# Patient Record
Sex: MALE | Race: WHITE | NOT HISPANIC OR LATINO | URBAN - METROPOLITAN AREA
[De-identification: names, ages, dates, MRNs, and addresses within clinical notes are randomized per-mention and may not be internally consistent; named-entity substitution may affect disease eponyms.]

---

## 2019-09-01 ENCOUNTER — EMERGENCY (EMERGENCY)
Facility: HOSPITAL | Age: 84
LOS: 1 days | Discharge: HOME | End: 2019-09-01
Attending: EMERGENCY MEDICINE
Payer: MEDICARE

## 2019-09-01 VITALS
TEMPERATURE: 98 F | OXYGEN SATURATION: 100 % | SYSTOLIC BLOOD PRESSURE: 186 MMHG | RESPIRATION RATE: 20 BRPM | DIASTOLIC BLOOD PRESSURE: 86 MMHG | HEART RATE: 58 BPM | WEIGHT: 209 LBS | HEIGHT: 69 IN

## 2019-09-01 VITALS — DIASTOLIC BLOOD PRESSURE: 62 MMHG | SYSTOLIC BLOOD PRESSURE: 120 MMHG

## 2019-09-01 LAB
ALBUMIN SERPL ELPH-MCNC: 4.1 G/DL — SIGNIFICANT CHANGE UP (ref 3.3–5.2)
ALP SERPL-CCNC: 77 U/L — SIGNIFICANT CHANGE UP (ref 40–120)
ALT FLD-CCNC: 12 U/L — SIGNIFICANT CHANGE UP
ANION GAP SERPL CALC-SCNC: 13 MMOL/L — SIGNIFICANT CHANGE UP (ref 5–17)
APTT BLD: 44.2 SEC — HIGH (ref 27.5–36.3)
AST SERPL-CCNC: 18 U/L — SIGNIFICANT CHANGE UP
BILIRUB SERPL-MCNC: 1.2 MG/DL — SIGNIFICANT CHANGE UP (ref 0.4–2)
BUN SERPL-MCNC: 20 MG/DL — SIGNIFICANT CHANGE UP (ref 8–20)
CALCIUM SERPL-MCNC: 9 MG/DL — SIGNIFICANT CHANGE UP (ref 8.6–10.2)
CHLORIDE SERPL-SCNC: 105 MMOL/L — SIGNIFICANT CHANGE UP (ref 98–107)
CO2 SERPL-SCNC: 24 MMOL/L — SIGNIFICANT CHANGE UP (ref 22–29)
CREAT SERPL-MCNC: 1.04 MG/DL — SIGNIFICANT CHANGE UP (ref 0.5–1.3)
D DIMER BLD IA.RAPID-MCNC: <150 NG/ML DDU — SIGNIFICANT CHANGE UP
GLUCOSE SERPL-MCNC: 135 MG/DL — HIGH (ref 70–115)
HCT VFR BLD CALC: 42.4 % — SIGNIFICANT CHANGE UP (ref 39–50)
HGB BLD-MCNC: 14 G/DL — SIGNIFICANT CHANGE UP (ref 13–17)
INR BLD: 1.19 RATIO — HIGH (ref 0.88–1.16)
MCHC RBC-ENTMCNC: 30.4 PG — SIGNIFICANT CHANGE UP (ref 27–34)
MCHC RBC-ENTMCNC: 33 GM/DL — SIGNIFICANT CHANGE UP (ref 32–36)
MCV RBC AUTO: 92.2 FL — SIGNIFICANT CHANGE UP (ref 80–100)
NT-PROBNP SERPL-SCNC: 122 PG/ML — SIGNIFICANT CHANGE UP (ref 0–300)
PLATELET # BLD AUTO: 134 K/UL — LOW (ref 150–400)
POTASSIUM SERPL-MCNC: 4.1 MMOL/L — SIGNIFICANT CHANGE UP (ref 3.5–5.3)
POTASSIUM SERPL-SCNC: 4.1 MMOL/L — SIGNIFICANT CHANGE UP (ref 3.5–5.3)
PROT SERPL-MCNC: 6.6 G/DL — SIGNIFICANT CHANGE UP (ref 6.6–8.7)
PROTHROM AB SERPL-ACNC: 13.7 SEC — HIGH (ref 10–12.9)
RBC # BLD: 4.6 M/UL — SIGNIFICANT CHANGE UP (ref 4.2–5.8)
RBC # FLD: 12.4 % — SIGNIFICANT CHANGE UP (ref 10.3–14.5)
SODIUM SERPL-SCNC: 142 MMOL/L — SIGNIFICANT CHANGE UP (ref 135–145)
TROPONIN T SERPL-MCNC: <0.01 NG/ML — SIGNIFICANT CHANGE UP (ref 0–0.06)
TROPONIN T SERPL-MCNC: <0.01 NG/ML — SIGNIFICANT CHANGE UP (ref 0–0.06)
WBC # BLD: 5.47 K/UL — SIGNIFICANT CHANGE UP (ref 3.8–10.5)
WBC # FLD AUTO: 5.47 K/UL — SIGNIFICANT CHANGE UP (ref 3.8–10.5)

## 2019-09-01 PROCEDURE — 85027 COMPLETE CBC AUTOMATED: CPT

## 2019-09-01 PROCEDURE — 80053 COMPREHEN METABOLIC PANEL: CPT

## 2019-09-01 PROCEDURE — 85610 PROTHROMBIN TIME: CPT

## 2019-09-01 PROCEDURE — 74174 CTA ABD&PLVS W/CONTRAST: CPT

## 2019-09-01 PROCEDURE — 71275 CT ANGIOGRAPHY CHEST: CPT

## 2019-09-01 PROCEDURE — 85379 FIBRIN DEGRADATION QUANT: CPT

## 2019-09-01 PROCEDURE — 96374 THER/PROPH/DIAG INJ IV PUSH: CPT | Mod: XU

## 2019-09-01 PROCEDURE — 85730 THROMBOPLASTIN TIME PARTIAL: CPT

## 2019-09-01 PROCEDURE — 74174 CTA ABD&PLVS W/CONTRAST: CPT | Mod: 26

## 2019-09-01 PROCEDURE — 99285 EMERGENCY DEPT VISIT HI MDM: CPT | Mod: 25

## 2019-09-01 PROCEDURE — 93005 ELECTROCARDIOGRAM TRACING: CPT

## 2019-09-01 PROCEDURE — 96375 TX/PRO/DX INJ NEW DRUG ADDON: CPT | Mod: XU

## 2019-09-01 PROCEDURE — 36415 COLL VENOUS BLD VENIPUNCTURE: CPT

## 2019-09-01 PROCEDURE — 93010 ELECTROCARDIOGRAM REPORT: CPT

## 2019-09-01 PROCEDURE — 71275 CT ANGIOGRAPHY CHEST: CPT | Mod: 26

## 2019-09-01 PROCEDURE — 71045 X-RAY EXAM CHEST 1 VIEW: CPT

## 2019-09-01 PROCEDURE — 83880 ASSAY OF NATRIURETIC PEPTIDE: CPT

## 2019-09-01 PROCEDURE — 84484 ASSAY OF TROPONIN QUANT: CPT

## 2019-09-01 PROCEDURE — 99284 EMERGENCY DEPT VISIT MOD MDM: CPT

## 2019-09-01 PROCEDURE — 71045 X-RAY EXAM CHEST 1 VIEW: CPT | Mod: 26

## 2019-09-01 RX ORDER — LANOLIN ALCOHOL/MO/W.PET/CERES
1 CREAM (GRAM) TOPICAL
Qty: 0 | Refills: 0 | DISCHARGE

## 2019-09-01 RX ORDER — ATORVASTATIN CALCIUM 80 MG/1
1 TABLET, FILM COATED ORAL
Qty: 0 | Refills: 0 | DISCHARGE

## 2019-09-01 RX ORDER — ASPIRIN/CALCIUM CARB/MAGNESIUM 324 MG
1 TABLET ORAL
Qty: 0 | Refills: 0 | DISCHARGE

## 2019-09-01 RX ORDER — DABIGATRAN ETEXILATE MESYLATE 150 MG/1
1 CAPSULE ORAL
Qty: 0 | Refills: 0 | DISCHARGE

## 2019-09-01 RX ORDER — MORPHINE SULFATE 50 MG/1
4 CAPSULE, EXTENDED RELEASE ORAL ONCE
Refills: 0 | Status: DISCONTINUED | OUTPATIENT
Start: 2019-09-01 | End: 2019-09-01

## 2019-09-01 RX ORDER — ZOLPIDEM TARTRATE 10 MG/1
1 TABLET ORAL
Qty: 0 | Refills: 0 | DISCHARGE

## 2019-09-01 RX ORDER — METOPROLOL TARTRATE 50 MG
0 TABLET ORAL
Qty: 0 | Refills: 0 | DISCHARGE

## 2019-09-01 RX ORDER — HYDRALAZINE HCL 50 MG
5 TABLET ORAL ONCE
Refills: 0 | Status: COMPLETED | OUTPATIENT
Start: 2019-09-01 | End: 2019-09-01

## 2019-09-01 RX ORDER — ONDANSETRON 8 MG/1
4 TABLET, FILM COATED ORAL ONCE
Refills: 0 | Status: COMPLETED | OUTPATIENT
Start: 2019-09-01 | End: 2019-09-01

## 2019-09-01 RX ADMIN — MORPHINE SULFATE 4 MILLIGRAM(S): 50 CAPSULE, EXTENDED RELEASE ORAL at 09:28

## 2019-09-01 RX ADMIN — Medication 5 MILLIGRAM(S): at 09:27

## 2019-09-01 RX ADMIN — ONDANSETRON 4 MILLIGRAM(S): 8 TABLET, FILM COATED ORAL at 09:26

## 2019-09-01 NOTE — ED ADULT NURSE NOTE - NSIMPLEMENTINTERV_GEN_ALL_ED
Implemented All Universal Safety Interventions:  [place] to call system. Call bell, personal items and telephone within reach. Instruct patient to call for assistance. Room bathroom lighting operational. Non-slip footwear when patient is off stretcher. Physically safe environment: no spills, clutter or unnecessary equipment. Stretcher in lowest position, wheels locked, appropriate side rails in place.

## 2019-09-01 NOTE — ED ADULT NURSE REASSESSMENT NOTE - NS ED NURSE REASSESS COMMENT FT1
Pt's BP & back pain improved after medication administration. Pt waiting for CT, pt & family aware of plan of care. Pt's BP & back pain improved after medication administration. Pt states he went from a 10/10 pain to a 0/10 pain. Pt waiting for CT, pt & family aware of plan of care.

## 2019-09-01 NOTE — ED ADULT TRIAGE NOTE - CHIEF COMPLAINT QUOTE
c/o upper back pain, when he moves pain is worse and worse with talking, diff. breathing started this morning

## 2019-09-01 NOTE — ED ADULT NURSE NOTE - OBJECTIVE STATEMENT
Pt arrives to ED c/o sudden onset of back pain in the right scapula region. Pt states he was sitting at the table eating when pain occurred, denies lifting something heavy, denies injury.  Pt A & OX4, K[place], hearing aid in right ear.

## 2019-09-05 NOTE — ED PROVIDER NOTE - PATIENT PORTAL LINK FT
You can access the FollowMyHealth Patient Portal offered by [facility] by registering at the following website: [email or web address]. By joining [employer]’s FollowMyHealth portal, you will also be able to view your health information using other applications (apps) compatible with our system.

## 2019-09-05 NOTE — ED PROVIDER NOTE - OBJECTIVE STATEMENT
89 yo male pmh afib, htn , hdl comes to ed with acute onset of mid scapular back pain which is increased with deep inspiration; pt denies any fever, cough , chest pain or abdominal pain;

## 2019-09-05 NOTE — ED PROVIDER NOTE - CLINICAL SUMMARY MEDICAL DECISION MAKING FREE TEXT BOX
89yo male pmh afib on pradaxa with acute onset of back pain; eval acs, pe vs aortic origin vs musculosketal;   labs, ct m pain meds and reeval

## 2019-11-13 NOTE — ED ADULT NURSE NOTE - NSSUHOSCREENINGYN_ED_ALL_ED
Attempted to schedule procedure with patient, no answer.  Left message to call office, number provided.   Yes - the patient is able to be screened

## 2019-12-07 ENCOUNTER — EMERGENCY (EMERGENCY)
Facility: HOSPITAL | Age: 84
LOS: 1 days | Discharge: HOME | End: 2019-12-07
Attending: EMERGENCY MEDICINE
Payer: MEDICARE

## 2019-12-07 VITALS
WEIGHT: 209 LBS | HEART RATE: 69 BPM | SYSTOLIC BLOOD PRESSURE: 148 MMHG | OXYGEN SATURATION: 96 % | RESPIRATION RATE: 18 BRPM | HEIGHT: 67 IN | DIASTOLIC BLOOD PRESSURE: 70 MMHG | TEMPERATURE: 100 F

## 2019-12-07 PROCEDURE — 99284 EMERGENCY DEPT VISIT MOD MDM: CPT | Mod: GC

## 2019-12-07 PROCEDURE — 71045 X-RAY EXAM CHEST 1 VIEW: CPT | Mod: 26

## 2019-12-07 RX ORDER — IPRATROPIUM/ALBUTEROL SULFATE 18-103MCG
3 AEROSOL WITH ADAPTER (GRAM) INHALATION ONCE
Refills: 0 | Status: COMPLETED | OUTPATIENT
Start: 2019-12-07 | End: 2019-12-07

## 2019-12-07 NOTE — ED PROVIDER NOTE - CLINICAL SUMMARY MEDICAL DECISION MAKING FREE TEXT BOX
89 y/o M pt with URI sx and chills for 2 days. Clinical suspicion for pna, will order labs, cxr, and ekg. Will give duoneb for wheezes. Plan to likely admit.

## 2019-12-07 NOTE — ED PROVIDER NOTE - ATTENDING CONTRIBUTION TO CARE
Productive cough, RLL crackles and questionable infiltrate on CXR, consistent with community acquired pneumonia. CURB-65 2, discussed possible outpatient treatment but pt lives alone with elderly wife, will admit for inpatient treatment of pneumonia due to mortality risk.

## 2019-12-07 NOTE — ED ADULT NURSE NOTE - NSIMPLEMENTINTERV_GEN_ALL_ED
Implemented All Fall with Harm Risk Interventions:  [place] to call system. Call bell, personal items and telephone within reach. Instruct patient to call for assistance. Room bathroom lighting operational. Non-slip footwear when patient is off stretcher. Physically safe environment: no spills, clutter or unnecessary equipment. Stretcher in lowest position, wheels locked, appropriate side rails in place. Provide visual cue, wrist band, yellow gown, etc. Monitor gait and stability. Monitor for mental status changes and reorient to person, place, and time. Review medications for side effects contributing to fall risk. Reinforce activity limits and safety measures with patient and family. Provide visual clues: red socks.

## 2019-12-07 NOTE — ED PROVIDER NOTE - PROGRESS NOTE DETAILS
CXR suspicious for R base infiltrate, will treat pna with rocephin and azithro. Offered admission vs. d/c for pna tx since pt appears to have stable care at home with family. Pt states he would be more comfortable with admission for tx. Plan to admit. Questions answered. CXR suspicious for R base infiltrate, will treat pna with rocephin and azithro. Offered admission vs. d/c for pna tx since pt appears to have close care at home with family. Pt and son state that they would be more comfortable with admission for tx. Plan to admit. Questions answered. CXR suspicious for R base infiltrate, will treat pna with rocephin and azithro. Offered admission vs. d/c for pna tx since pt appears to have close care at home with family. Pt and son state that they would be more comfortable with admission for tx. [place]: RVP RSV positive. Did show improvement with nebulizer. Plan for observation in CDU, if remaining comfortable on room air and continuing to improve can likely go home within 24 hours.

## 2019-12-07 NOTE — ED ADULT NURSE NOTE - OBJECTIVE STATEMENT
pt received A+Ox4, in no apparent distress. son at bedside. pt c/o congestion, chills and productive cough with yellow/white sputum x3 days. pt denies any CP. RR even and unlabored. pt denies fevers at home. per family, pt is admitted to hospital often in the winter time for tx of PNA/bronchitis. pt able to ambulate with steady gait, GLOVER wellx4. pt educated on POC, verbalized understanding. pt safety measures maintained.

## 2019-12-07 NOTE — ED ADULT TRIAGE NOTE - TEMPERATURE IN CELSIUS (DEGREES C)
Assessment complete. Pt reports post-op abd pain at 3/10 and pt considers it tolerable. Will continue to monitor pain and medicate appropriately. PIV infusing. Safety precautions in place. Call light in reach.   37.7

## 2019-12-07 NOTE — ED PROVIDER NOTE - PHYSICAL EXAMINATION
Gen: no acute distress  Head: normocephalic, atraumatic  ENT: posterior oropharynx nonerythematous or edematous  Lung: no respiratory distress, coarse breath sounds and expiratory wheezes throughout, rales auscultated at R base  CV: normal s1/s2, rrr, no murmurs, Normal perfusion  Abd: soft, non-tender, non-distended  MSK: No edema, no visible deformities, full range of motion in all 4 extremities  Neuro: No focal neurologic deficits  Skin: No rash   Psych: normal affect

## 2019-12-07 NOTE — ED PROVIDER NOTE - CARE PLAN
Principal Discharge DX:	Community acquired pneumonia Principal Discharge DX:	RSV (respiratory syncytial virus infection)

## 2019-12-07 NOTE — ED PROVIDER NOTE - OBJECTIVE STATEMENT
89 y/o M pt with hx of afib, HTN, HLD, and prostate ca who presents today with cc of cough for 2 days. Pt states that he has had a cough productive of white/yellow sputum with associated congestion, runny nose, and chills. Family states that pt is typically admitted to the hospital once every winter for pna/bronchitis sx. Pt is also c/o a substernal chest pain that worsens with cough, it is non-exertional, non-radiating, and non-pleuretic. Pt denies any n/v, abdominal pain, diarrhea, headache, leg pain, leg swelling, or other complaint.

## 2019-12-08 VITALS
DIASTOLIC BLOOD PRESSURE: 74 MMHG | SYSTOLIC BLOOD PRESSURE: 124 MMHG | RESPIRATION RATE: 18 BRPM | OXYGEN SATURATION: 95 %

## 2019-12-08 DIAGNOSIS — Z98.890 OTHER SPECIFIED POSTPROCEDURAL STATES: Chronic | ICD-10-CM

## 2019-12-08 PROBLEM — I10 ESSENTIAL (PRIMARY) HYPERTENSION: Chronic | Status: ACTIVE | Noted: 2019-09-01

## 2019-12-08 PROBLEM — E78.00 PURE HYPERCHOLESTEROLEMIA, UNSPECIFIED: Chronic | Status: ACTIVE | Noted: 2019-09-01

## 2019-12-08 PROBLEM — I48.91 UNSPECIFIED ATRIAL FIBRILLATION: Chronic | Status: ACTIVE | Noted: 2019-09-01

## 2019-12-08 LAB
ALBUMIN SERPL ELPH-MCNC: 4 G/DL — SIGNIFICANT CHANGE UP (ref 3.3–5.2)
ALP SERPL-CCNC: 70 U/L — SIGNIFICANT CHANGE UP (ref 40–120)
ALT FLD-CCNC: 11 U/L — SIGNIFICANT CHANGE UP
ANION GAP SERPL CALC-SCNC: 11 MMOL/L — SIGNIFICANT CHANGE UP (ref 5–17)
AST SERPL-CCNC: 17 U/L — SIGNIFICANT CHANGE UP
BASE EXCESS BLDV CALC-SCNC: -0.7 MMOL/L — SIGNIFICANT CHANGE UP (ref -2–2)
BASOPHILS # BLD AUTO: 0.04 K/UL — SIGNIFICANT CHANGE UP (ref 0–0.2)
BASOPHILS NFR BLD AUTO: 0.6 % — SIGNIFICANT CHANGE UP (ref 0–2)
BILIRUB SERPL-MCNC: 1.5 MG/DL — SIGNIFICANT CHANGE UP (ref 0.4–2)
BUN SERPL-MCNC: 23 MG/DL — HIGH (ref 8–20)
CALCIUM SERPL-MCNC: 8.7 MG/DL — SIGNIFICANT CHANGE UP (ref 8.6–10.2)
CHLORIDE SERPL-SCNC: 104 MMOL/L — SIGNIFICANT CHANGE UP (ref 98–107)
CO2 SERPL-SCNC: 23 MMOL/L — SIGNIFICANT CHANGE UP (ref 22–29)
CREAT SERPL-MCNC: 1.11 MG/DL — SIGNIFICANT CHANGE UP (ref 0.5–1.3)
EOSINOPHIL # BLD AUTO: 0.43 K/UL — SIGNIFICANT CHANGE UP (ref 0–0.5)
EOSINOPHIL NFR BLD AUTO: 6.4 % — HIGH (ref 0–6)
GAS PNL BLDV: SIGNIFICANT CHANGE UP
GLUCOSE SERPL-MCNC: 116 MG/DL — HIGH (ref 70–115)
HCO3 BLDV-SCNC: 24 MMOL/L — SIGNIFICANT CHANGE UP (ref 20–26)
HCT VFR BLD CALC: 41.3 % — SIGNIFICANT CHANGE UP (ref 39–50)
HGB BLD-MCNC: 13.5 G/DL — SIGNIFICANT CHANGE UP (ref 13–17)
IMM GRANULOCYTES NFR BLD AUTO: 0.1 % — SIGNIFICANT CHANGE UP (ref 0–1.5)
LYMPHOCYTES # BLD AUTO: 0.83 K/UL — LOW (ref 1–3.3)
LYMPHOCYTES # BLD AUTO: 12.4 % — LOW (ref 13–44)
MCHC RBC-ENTMCNC: 30.5 PG — SIGNIFICANT CHANGE UP (ref 27–34)
MCHC RBC-ENTMCNC: 32.7 GM/DL — SIGNIFICANT CHANGE UP (ref 32–36)
MCV RBC AUTO: 93.2 FL — SIGNIFICANT CHANGE UP (ref 80–100)
MONOCYTES # BLD AUTO: 0.85 K/UL — SIGNIFICANT CHANGE UP (ref 0–0.9)
MONOCYTES NFR BLD AUTO: 12.7 % — SIGNIFICANT CHANGE UP (ref 2–14)
NEUTROPHILS # BLD AUTO: 4.51 K/UL — SIGNIFICANT CHANGE UP (ref 1.8–7.4)
NEUTROPHILS NFR BLD AUTO: 67.8 % — SIGNIFICANT CHANGE UP (ref 43–77)
PCO2 BLDV: 42 MMHG — SIGNIFICANT CHANGE UP (ref 35–50)
PH BLDV: 7.38 — SIGNIFICANT CHANGE UP (ref 7.32–7.43)
PLATELET # BLD AUTO: 113 K/UL — LOW (ref 150–400)
PO2 BLDV: 68 MMHG — HIGH (ref 25–45)
POTASSIUM SERPL-MCNC: 4.4 MMOL/L — SIGNIFICANT CHANGE UP (ref 3.5–5.3)
POTASSIUM SERPL-SCNC: 4.4 MMOL/L — SIGNIFICANT CHANGE UP (ref 3.5–5.3)
PROT SERPL-MCNC: 6.5 G/DL — LOW (ref 6.6–8.7)
RAPID RVP RESULT: DETECTED
RBC # BLD: 4.43 M/UL — SIGNIFICANT CHANGE UP (ref 4.2–5.8)
RBC # FLD: 12.8 % — SIGNIFICANT CHANGE UP (ref 10.3–14.5)
RSV RNA SPEC QL NAA+PROBE: DETECTED
SAO2 % BLDV: 94 % — SIGNIFICANT CHANGE UP
SODIUM SERPL-SCNC: 138 MMOL/L — SIGNIFICANT CHANGE UP (ref 135–145)
WBC # BLD: 6.67 K/UL — SIGNIFICANT CHANGE UP (ref 3.8–10.5)
WBC # FLD AUTO: 6.67 K/UL — SIGNIFICANT CHANGE UP (ref 3.8–10.5)

## 2019-12-08 PROCEDURE — G0378: CPT

## 2019-12-08 PROCEDURE — 71045 X-RAY EXAM CHEST 1 VIEW: CPT

## 2019-12-08 PROCEDURE — 99284 EMERGENCY DEPT VISIT MOD MDM: CPT | Mod: 25

## 2019-12-08 PROCEDURE — 87798 DETECT AGENT NOS DNA AMP: CPT

## 2019-12-08 PROCEDURE — 87633 RESP VIRUS 12-25 TARGETS: CPT

## 2019-12-08 PROCEDURE — 80053 COMPREHEN METABOLIC PANEL: CPT

## 2019-12-08 PROCEDURE — 93005 ELECTROCARDIOGRAM TRACING: CPT

## 2019-12-08 PROCEDURE — 96367 TX/PROPH/DG ADDL SEQ IV INF: CPT

## 2019-12-08 PROCEDURE — 87581 M.PNEUMON DNA AMP PROBE: CPT

## 2019-12-08 PROCEDURE — 99220: CPT

## 2019-12-08 PROCEDURE — 93010 ELECTROCARDIOGRAM REPORT: CPT

## 2019-12-08 PROCEDURE — 36415 COLL VENOUS BLD VENIPUNCTURE: CPT

## 2019-12-08 PROCEDURE — 87486 CHLMYD PNEUM DNA AMP PROBE: CPT

## 2019-12-08 PROCEDURE — 96365 THER/PROPH/DIAG IV INF INIT: CPT

## 2019-12-08 PROCEDURE — 82803 BLOOD GASES ANY COMBINATION: CPT

## 2019-12-08 PROCEDURE — 85027 COMPLETE CBC AUTOMATED: CPT

## 2019-12-08 PROCEDURE — 94640 AIRWAY INHALATION TREATMENT: CPT

## 2019-12-08 RX ORDER — ZOLPIDEM TARTRATE 10 MG/1
5 TABLET ORAL AT BEDTIME
Refills: 0 | Status: DISCONTINUED | OUTPATIENT
Start: 2019-12-08 | End: 2019-12-08

## 2019-12-08 RX ORDER — ATORVASTATIN CALCIUM 80 MG/1
10 TABLET, FILM COATED ORAL AT BEDTIME
Refills: 0 | Status: DISCONTINUED | OUTPATIENT
Start: 2019-12-08 | End: 2020-01-09

## 2019-12-08 RX ORDER — DABIGATRAN ETEXILATE MESYLATE 150 MG/1
150 CAPSULE ORAL ONCE
Refills: 0 | Status: COMPLETED | OUTPATIENT
Start: 2019-12-08 | End: 2019-12-08

## 2019-12-08 RX ORDER — ACETAMINOPHEN 500 MG
650 TABLET ORAL EVERY 6 HOURS
Refills: 0 | Status: DISCONTINUED | OUTPATIENT
Start: 2019-12-08 | End: 2020-01-09

## 2019-12-08 RX ORDER — ALBUTEROL 90 UG/1
3 AEROSOL, METERED ORAL
Qty: 90 | Refills: 0
Start: 2019-12-08 | End: 2019-12-16

## 2019-12-08 RX ORDER — METOPROLOL TARTRATE 50 MG
25 TABLET ORAL DAILY
Refills: 0 | Status: DISCONTINUED | OUTPATIENT
Start: 2019-12-08 | End: 2020-01-09

## 2019-12-08 RX ORDER — IPRATROPIUM/ALBUTEROL SULFATE 18-103MCG
3 AEROSOL WITH ADAPTER (GRAM) INHALATION EVERY 6 HOURS
Refills: 0 | Status: DISCONTINUED | OUTPATIENT
Start: 2019-12-08 | End: 2020-01-09

## 2019-12-08 RX ORDER — AZITHROMYCIN 500 MG/1
500 TABLET, FILM COATED ORAL ONCE
Refills: 0 | Status: DISCONTINUED | OUTPATIENT
Start: 2019-12-08 | End: 2019-12-08

## 2019-12-08 RX ORDER — CEFTRIAXONE 500 MG/1
1000 INJECTION, POWDER, FOR SOLUTION INTRAMUSCULAR; INTRAVENOUS ONCE
Refills: 0 | Status: DISCONTINUED | OUTPATIENT
Start: 2019-12-08 | End: 2019-12-08

## 2019-12-08 RX ORDER — ALBUTEROL 90 UG/1
3 AEROSOL, METERED ORAL
Qty: 90 | Refills: 0
Start: 2019-12-08 | End: 2019-12-12

## 2019-12-08 RX ORDER — AZITHROMYCIN 500 MG/1
500 TABLET, FILM COATED ORAL ONCE
Refills: 0 | Status: COMPLETED | OUTPATIENT
Start: 2019-12-08 | End: 2019-12-08

## 2019-12-08 RX ORDER — CEFTRIAXONE 500 MG/1
1000 INJECTION, POWDER, FOR SOLUTION INTRAMUSCULAR; INTRAVENOUS ONCE
Refills: 0 | Status: COMPLETED | OUTPATIENT
Start: 2019-12-08 | End: 2019-12-08

## 2019-12-08 RX ADMIN — Medication 650 MILLIGRAM(S): at 03:51

## 2019-12-08 RX ADMIN — Medication 25 MILLIGRAM(S): at 06:11

## 2019-12-08 RX ADMIN — AZITHROMYCIN 255 MILLIGRAM(S): 500 TABLET, FILM COATED ORAL at 03:22

## 2019-12-08 RX ADMIN — Medication 650 MILLIGRAM(S): at 03:21

## 2019-12-08 RX ADMIN — DABIGATRAN ETEXILATE MESYLATE 150 MILLIGRAM(S): 150 CAPSULE ORAL at 03:22

## 2019-12-08 RX ADMIN — Medication 3 MILLILITER(S): at 06:11

## 2019-12-08 RX ADMIN — Medication 5 MILLIGRAM(S): at 03:22

## 2019-12-08 RX ADMIN — CEFTRIAXONE 1000 MILLIGRAM(S): 500 INJECTION, POWDER, FOR SOLUTION INTRAMUSCULAR; INTRAVENOUS at 02:30

## 2019-12-08 RX ADMIN — Medication 3 MILLILITER(S): at 00:20

## 2019-12-08 RX ADMIN — AZITHROMYCIN 500 MILLIGRAM(S): 500 TABLET, FILM COATED ORAL at 04:22

## 2019-12-08 RX ADMIN — Medication 100 MILLIGRAM(S): at 03:22

## 2019-12-08 RX ADMIN — CEFTRIAXONE 100 MILLIGRAM(S): 500 INJECTION, POWDER, FOR SOLUTION INTRAMUSCULAR; INTRAVENOUS at 02:00

## 2019-12-08 NOTE — ED CDU PROVIDER DISPOSITION NOTE - PATIENT PORTAL LINK FT
You can access the FollowMyHealth Patient Portal offered by [facility] by registering at the following website: [email or web address]. By joining [facility]’s FollowMyHealth portal, you will also be able to view your health information using other applications (apps) compatible with our system.

## 2019-12-08 NOTE — ED CDU PROVIDER DISPOSITION NOTE - ATTENDING CONTRIBUTION TO CARE
I, Dr. Enciso, performed a face to face bedside interview with this patient regarding history of present illness, review of symptoms and relevant past medical, social and family history.  I completed an independent physical examination.  I have also reviewed the ACP's note(s) and discussed the plan with the ACP.

## 2019-12-08 NOTE — ED CDU PROVIDER DISPOSITION NOTE - CLINICAL COURSE
89 y/o M pt with hx of afib, HTN, HLD, and prostate ca who presents YESTERDAY with cc of cough for 2 days. Pt states that he has had a cough productive of white/yellow sputum with associated congestion, runny nose, and chills. Family states that pt is typically admitted to the hospital once every winter for pna/bronchitis sx. Pt is also c/o a substernal chest pain that worsens with cough, it is non-exertional, non-radiating.  + for RSV, non-toxic in appearance, ambulating around ED without difficulty or SOB.  Will rx prednisone, albuterol nebulizer (has machine at home).  Will need to see PCP tomorrow or Tuesday for follow up.

## 2019-12-08 NOTE — ED ADULT NURSE REASSESSMENT NOTE - NS ED NURSE REASSESS COMMENT FT1
pt AOX4 in no apparent distress, denies any pain at the moment, IV patent and flushing without difficulty, no sing of infiltration. plan of care explained to pt, pt verbalized understanding.

## 2019-12-08 NOTE — ED CDU PROVIDER INITIAL DAY NOTE - PROGRESS NOTE DETAILS
Chart, results and CXR reviewed.  Patient without any c/o.  Coughing, in no respiratory distress, lungs with course breath sounds, expiratory wheeze.  Remains afebrile.

## 2019-12-08 NOTE — ED CDU PROVIDER INITIAL DAY NOTE - PMH
Afib    High cholesterol    HTN (hypertension) Afib    CAD (coronary artery disease)  cardiac stent 2014  High cholesterol    HTN (hypertension)    Prostate cancer

## 2019-12-08 NOTE — ED CDU PROVIDER INITIAL DAY NOTE - ATTENDING CONTRIBUTION TO CARE
I agree with the plan of care above. Care managed by ACP under my direction and I was available for consultation as need.

## 2019-12-08 NOTE — ED ADULT NURSE REASSESSMENT NOTE - NS ED NURSE REASSESS COMMENT FT1
pt remains A+Ox4, in no apparent distress. pt breathing even and unlabored. GLOVER well x4. pt states +relief of cough at this time. pt remains on observation at this time for treatment. pt educated on POC, verbalized understanding. pt safety measures maintained.

## 2019-12-12 RX ORDER — ALBUTEROL 90 UG/1
3 AEROSOL, METERED ORAL
Qty: 90 | Refills: 0
Start: 2019-12-12 | End: 2019-12-18

## 2021-10-14 ENCOUNTER — EMERGENCY (EMERGENCY)
Facility: HOSPITAL | Age: 86
LOS: 1 days | Discharge: HOME | End: 2021-10-14
Attending: EMERGENCY MEDICINE
Payer: MEDICARE

## 2021-10-14 VITALS
HEIGHT: 67 IN | SYSTOLIC BLOOD PRESSURE: 192 MMHG | DIASTOLIC BLOOD PRESSURE: 88 MMHG | HEART RATE: 55 BPM | RESPIRATION RATE: 20 BRPM | TEMPERATURE: 98 F | WEIGHT: 218.26 LBS | OXYGEN SATURATION: 97 %

## 2021-10-14 VITALS — SYSTOLIC BLOOD PRESSURE: 165 MMHG | HEART RATE: 54 BPM | TEMPERATURE: 98 F | DIASTOLIC BLOOD PRESSURE: 78 MMHG

## 2021-10-14 DIAGNOSIS — Z98.890 OTHER SPECIFIED POSTPROCEDURAL STATES: Chronic | ICD-10-CM

## 2021-10-14 PROBLEM — C61 MALIGNANT NEOPLASM OF PROSTATE: Chronic | Status: ACTIVE | Noted: 2019-12-08

## 2021-10-14 PROBLEM — I25.10 ATHEROSCLEROTIC HEART DISEASE OF NATIVE CORONARY ARTERY WITHOUT ANGINA PECTORIS: Chronic | Status: ACTIVE | Noted: 2019-12-08

## 2021-10-14 LAB
ALBUMIN SERPL ELPH-MCNC: 4 G/DL — SIGNIFICANT CHANGE UP (ref 3.3–5.2)
ALP SERPL-CCNC: 59 U/L — SIGNIFICANT CHANGE UP (ref 40–120)
ALT FLD-CCNC: 13 U/L — SIGNIFICANT CHANGE UP
ANION GAP SERPL CALC-SCNC: 10 MMOL/L — SIGNIFICANT CHANGE UP (ref 5–17)
AST SERPL-CCNC: 22 U/L — SIGNIFICANT CHANGE UP
BASOPHILS # BLD AUTO: 0.04 K/UL — SIGNIFICANT CHANGE UP (ref 0–0.2)
BASOPHILS NFR BLD AUTO: 0.7 % — SIGNIFICANT CHANGE UP (ref 0–2)
BILIRUB SERPL-MCNC: 0.9 MG/DL — SIGNIFICANT CHANGE UP (ref 0.4–2)
BUN SERPL-MCNC: 20 MG/DL — SIGNIFICANT CHANGE UP (ref 8–20)
CALCIUM SERPL-MCNC: 8.8 MG/DL — SIGNIFICANT CHANGE UP (ref 8.6–10.2)
CHLORIDE SERPL-SCNC: 107 MMOL/L — SIGNIFICANT CHANGE UP (ref 98–107)
CO2 SERPL-SCNC: 25 MMOL/L — SIGNIFICANT CHANGE UP (ref 22–29)
CREAT SERPL-MCNC: 1.03 MG/DL — SIGNIFICANT CHANGE UP (ref 0.5–1.3)
EOSINOPHIL # BLD AUTO: 0.18 K/UL — SIGNIFICANT CHANGE UP (ref 0–0.5)
EOSINOPHIL NFR BLD AUTO: 3.2 % — SIGNIFICANT CHANGE UP (ref 0–6)
GLUCOSE SERPL-MCNC: 102 MG/DL — HIGH (ref 70–99)
HCT VFR BLD CALC: 33.9 % — LOW (ref 39–50)
HGB BLD-MCNC: 11.9 G/DL — LOW (ref 13–17)
IMM GRANULOCYTES NFR BLD AUTO: 0.4 % — SIGNIFICANT CHANGE UP (ref 0–1.5)
LACTATE BLDV-MCNC: 0.9 MMOL/L — SIGNIFICANT CHANGE UP (ref 0.5–2)
LYMPHOCYTES # BLD AUTO: 1.09 K/UL — SIGNIFICANT CHANGE UP (ref 1–3.3)
LYMPHOCYTES # BLD AUTO: 19.5 % — SIGNIFICANT CHANGE UP (ref 13–44)
MCHC RBC-ENTMCNC: 32.3 PG — SIGNIFICANT CHANGE UP (ref 27–34)
MCHC RBC-ENTMCNC: 35.1 GM/DL — SIGNIFICANT CHANGE UP (ref 32–36)
MCV RBC AUTO: 92.1 FL — SIGNIFICANT CHANGE UP (ref 80–100)
MONOCYTES # BLD AUTO: 0.48 K/UL — SIGNIFICANT CHANGE UP (ref 0–0.9)
MONOCYTES NFR BLD AUTO: 8.6 % — SIGNIFICANT CHANGE UP (ref 2–14)
NEUTROPHILS # BLD AUTO: 3.77 K/UL — SIGNIFICANT CHANGE UP (ref 1.8–7.4)
NEUTROPHILS NFR BLD AUTO: 67.6 % — SIGNIFICANT CHANGE UP (ref 43–77)
NT-PROBNP SERPL-SCNC: 295 PG/ML — SIGNIFICANT CHANGE UP (ref 0–300)
PLATELET # BLD AUTO: 153 K/UL — SIGNIFICANT CHANGE UP (ref 150–400)
POTASSIUM SERPL-MCNC: 4.5 MMOL/L — SIGNIFICANT CHANGE UP (ref 3.5–5.3)
POTASSIUM SERPL-SCNC: 4.5 MMOL/L — SIGNIFICANT CHANGE UP (ref 3.5–5.3)
PROT SERPL-MCNC: 6.4 G/DL — LOW (ref 6.6–8.7)
RAPID RVP RESULT: DETECTED
RBC # BLD: 3.68 M/UL — LOW (ref 4.2–5.8)
RBC # FLD: 12.8 % — SIGNIFICANT CHANGE UP (ref 10.3–14.5)
RV+EV RNA SPEC QL NAA+PROBE: DETECTED
SARS-COV-2 RNA SPEC QL NAA+PROBE: SIGNIFICANT CHANGE UP
SODIUM SERPL-SCNC: 142 MMOL/L — SIGNIFICANT CHANGE UP (ref 135–145)
TROPONIN T SERPL-MCNC: <0.01 NG/ML — SIGNIFICANT CHANGE UP (ref 0–0.06)
WBC # BLD: 5.58 K/UL — SIGNIFICANT CHANGE UP (ref 3.8–10.5)
WBC # FLD AUTO: 5.58 K/UL — SIGNIFICANT CHANGE UP (ref 3.8–10.5)

## 2021-10-14 PROCEDURE — 36415 COLL VENOUS BLD VENIPUNCTURE: CPT

## 2021-10-14 PROCEDURE — 94640 AIRWAY INHALATION TREATMENT: CPT

## 2021-10-14 PROCEDURE — 71045 X-RAY EXAM CHEST 1 VIEW: CPT

## 2021-10-14 PROCEDURE — 99285 EMERGENCY DEPT VISIT HI MDM: CPT

## 2021-10-14 PROCEDURE — 85025 COMPLETE CBC W/AUTO DIFF WBC: CPT

## 2021-10-14 PROCEDURE — 84484 ASSAY OF TROPONIN QUANT: CPT

## 2021-10-14 PROCEDURE — 99285 EMERGENCY DEPT VISIT HI MDM: CPT | Mod: 25

## 2021-10-14 PROCEDURE — 96374 THER/PROPH/DIAG INJ IV PUSH: CPT

## 2021-10-14 PROCEDURE — 93010 ELECTROCARDIOGRAM REPORT: CPT

## 2021-10-14 PROCEDURE — 0225U NFCT DS DNA&RNA 21 SARSCOV2: CPT

## 2021-10-14 PROCEDURE — 71045 X-RAY EXAM CHEST 1 VIEW: CPT | Mod: 26

## 2021-10-14 PROCEDURE — 96375 TX/PRO/DX INJ NEW DRUG ADDON: CPT

## 2021-10-14 PROCEDURE — 71250 CT THORAX DX C-: CPT | Mod: 26,MA

## 2021-10-14 PROCEDURE — 71250 CT THORAX DX C-: CPT | Mod: MA

## 2021-10-14 PROCEDURE — 83880 ASSAY OF NATRIURETIC PEPTIDE: CPT

## 2021-10-14 PROCEDURE — 83605 ASSAY OF LACTIC ACID: CPT

## 2021-10-14 PROCEDURE — 80053 COMPREHEN METABOLIC PANEL: CPT

## 2021-10-14 PROCEDURE — 93005 ELECTROCARDIOGRAM TRACING: CPT

## 2021-10-14 RX ORDER — SODIUM CHLORIDE 9 MG/ML
500 INJECTION INTRAMUSCULAR; INTRAVENOUS; SUBCUTANEOUS ONCE
Refills: 0 | Status: COMPLETED | OUTPATIENT
Start: 2021-10-14 | End: 2021-10-14

## 2021-10-14 RX ORDER — MAGNESIUM SULFATE 500 MG/ML
2 VIAL (ML) INJECTION ONCE
Refills: 0 | Status: COMPLETED | OUTPATIENT
Start: 2021-10-14 | End: 2021-10-14

## 2021-10-14 RX ORDER — IPRATROPIUM/ALBUTEROL SULFATE 18-103MCG
3 AEROSOL WITH ADAPTER (GRAM) INHALATION
Refills: 0 | Status: COMPLETED | OUTPATIENT
Start: 2021-10-14 | End: 2021-10-14

## 2021-10-14 RX ORDER — FLUTICASONE PROPIONATE AND SALMETEROL 50; 250 UG/1; UG/1
1 POWDER ORAL; RESPIRATORY (INHALATION)
Qty: 1 | Refills: 0
Start: 2021-10-14

## 2021-10-14 RX ADMIN — Medication 125 MILLIGRAM(S): at 10:20

## 2021-10-14 RX ADMIN — Medication 3 MILLILITER(S): at 10:50

## 2021-10-14 RX ADMIN — Medication 3 MILLILITER(S): at 10:30

## 2021-10-14 RX ADMIN — Medication 3 MILLILITER(S): at 12:15

## 2021-10-14 RX ADMIN — Medication 50 GRAM(S): at 10:55

## 2021-10-14 RX ADMIN — SODIUM CHLORIDE 500 MILLILITER(S): 9 INJECTION INTRAMUSCULAR; INTRAVENOUS; SUBCUTANEOUS at 10:37

## 2021-10-14 NOTE — ED PROVIDER NOTE - CLINICAL SUMMARY MEDICAL DECISION MAKING FREE TEXT BOX
pt has wheezing with uri symptoms, concern for pna, will do cxr and cary ct chest to better characterize lung details given age, no downside of radiation, will check labs, treat with nebs, solumedrol and MAGNESIUM and reassess, likely discharge home

## 2021-10-14 NOTE — ED PROVIDER NOTE - OBJECTIVE STATEMENT
89 y/o M with h/o ex smoking, recurrent bronchitis, htn, prostate ca in remission for 20 yrs ago, chf with hfref p/w cough, wheezing and chest pain upon coughing for 2 days, worst with laying down. No fever but got his covid booster shot yesterday while sick, His daughter in law has similar uri symptoms that resolved but he persisted having cough. Pt lives with family and son at bedside 89 y/o M with h/o ex smoking, recurrent bronchitis, htn, prostate ca in remission for 20 yrs ago, chf with hfref p/w cough, wheezing and chest pain upon coughing for 2 days, worst with laying down. No fever but got his covid booster shot yesterday while sick, His daughter in law has similar uri symptoms that resolved but he persisted having cough. Pt lives with family and son at bedside. Patient denies fever/chills, sore throat, abd pain, n/v/d, urinary complaints, focal weakness/numbness/tingling in extremities.

## 2021-10-14 NOTE — ED ADULT TRIAGE NOTE - SOURCE OF INFORMATION
Refill requested for patient's trazodone. Last fill 3-29-18, 30 dispensed with 5 refills.  Duplicate request, denied.   Patient

## 2021-10-14 NOTE — ED PROVIDER NOTE - NSFOLLOWUPINSTRUCTIONS_ED_ALL_ED_FT
Acute Bronchitis    WHAT YOU NEED TO KNOW:    What is acute bronchitis? Acute bronchitis is swelling and irritation in your lungs. It is usually caused by a virus and most often happens in the winter. Bronchitis may also be caused by bacteria or by a chemical irritant, such as smoke.    What are the signs and symptoms of acute bronchitis?   •Cough that lasts up to 3 weeks, stuffy nose      •Hoarseness, sore throat      •A fever and chills      •Feeling more tired than usual, and body aches      •Wheezing or pain when you breathe or cough      How is acute bronchitis treated? You may need any of the following:   •Cough suppressants decrease your urge to cough.      •Decongestants help loosen mucus in your lungs and make it easier to cough up. This can help you breathe easier.      •Inhalers may be given. Your healthcare provider may give you one or more inhalers to help you breathe easier and cough less. An inhaler gives you medicine to open your airways. Ask your healthcare provider to show you how to use your inhaler correctly.  Metered Dose Inhaler           •Antibiotics may be given for up to 5 days if your bronchitis is caused by bacteria.      •Acetaminophen decreases pain and fever. It is available without a doctor's order. Ask how much to take and how often to take it. Follow directions. Read the labels of all other medicines you are using to see if they also contain acetaminophen, or ask your doctor or pharmacist. Acetaminophen can cause liver damage if not taken correctly. Do not use more than 4 grams (4,000 milligrams) total of acetaminophen in one day.       •NSAIDs help decrease swelling and pain or fever. This medicine is available with or without a doctor's order. NSAIDs can cause stomach bleeding or kidney problems in certain people. If you take blood thinner medicine, always ask your healthcare provider if NSAIDs are safe for you. Always read the medicine label and follow directions.      How can I manage my symptoms?   •Drink liquids as directed. You may need to drink more liquids than usual to stay hydrated. Ask how much liquid to drink each day and which liquids are best for you.      •Use a cool mist humidifier to increase air moisture in your home. This may make it easier for you to breathe and help decrease your cough.       •Get more rest. Rest helps your body to heal. Slowly start to do more each day. Rest when you feel it is needed.      •Avoid irritants in the air. Avoid chemicals, fumes, and dust. Wear a face mask if you must work around dust or fumes. Stay inside on days when air pollution levels are high. If you have allergies, stay inside when pollen counts are high. Do not use aerosol products, such as spray-on deodorant, bug spray, and hair spray.      •Do not smoke or be around others who are smoking. Nicotine and other chemicals in cigarettes and cigars can cause lung damage. Ask your healthcare provider for information if you currently smoke and need help to quit. E-cigarettes or smokeless tobacco still contain nicotine. Talk to your healthcare provider before you use these products.       How can I help prevent acute bronchitis?         •Ask about vaccines you may need. Get a flu vaccine each year as soon as recommended, usually in September or October. Ask your healthcare provider if you should also get a pneumonia or COVID-19 vaccine. Your healthcare provider can tell you if you should also get other vaccines, and when to get them.      •Prevent the spread of germs. ?Wash your hands often with soap and water. Carry germ-killing hand lotion or gel with you. You can use the lotion or gel to clean your hands when soap and water are not available.  Handwashing           ?Do not touch your eyes, nose, or mouth unless you have washed your hands first.      ?Always cover your mouth when you cough to prevent the spread of germs. It is best to cough into a tissue or your shirt sleeve instead of into your hand. Ask those around you to cover their mouths when they cough.      ?Try to avoid people who have a cold or the flu. If you are sick, stay away from others as much as possible.        When should I seek immediate care?   •You cough up blood.      •Your lips or fingernails turn blue.      •You feel like you are not getting enough air when you breathe.      When should I call my doctor?   •Your symptoms do not go away or get worse, even after treatment.      •Your cough does not get better within 4 weeks.      •You have questions or concerns about your condition or care.      CARE AGREEMENT:    You have the right to help plan your care. Learn about your health condition and how it may be treated. Discuss treatment options with your healthcare providers to decide what care you want to receive. You always have the right to refuse treatment.

## 2021-10-14 NOTE — ED PROVIDER NOTE - CONSTITUTIONAL [-], MLM
Hub can relay message: LVM for patient to call the office and reschedule new patient appointment. Dr Francisco is only in [facility] on Monday and thursdays. Patient needs to reschedule her appointment to one of those days.   
no fever/no night sweats/no weight loss

## 2021-10-14 NOTE — ED PROVIDER NOTE - PATIENT PORTAL LINK FT
You can access the FollowMyHealth Patient Portal offered by [facility] by registering at the following website: [email or web address]. By joining [employer]’s FollowMyHealth portal, you will also be able to view your health information using other applications (apps) compatible with our system.

## 2021-10-14 NOTE — ED PROVIDER NOTE - NSICDXPASTMEDICALHX_GEN_ALL_CORE_FT
PAST MEDICAL HISTORY:  Afib     CAD (coronary artery disease) cardiac stent 2014    High cholesterol     HTN (hypertension)     Prostate cancer

## 2021-10-14 NOTE — ED PROVIDER NOTE - CROS ED MUSC ALL NEG
Problem: Safety  Goal: Will remain free from falls  Intervention: Assess risk factors for falls  Fall risk assessed and patient educated to call for assistance when needed.      Problem: Knowledge Deficit  Goal: Knowledge of disease process/condition, treatment plan, diagnostic tests, and medications will improve  Intervention: Assess knowledge level of disease process/condition, treatment plan, diagnostic tests, and medications  Knowledge assessed regarding plan of care, patient verbalized understanding.           negative...

## 2021-10-14 NOTE — ED ADULT NURSE NOTE - NSIMPLEMENTINTERV_GEN_ALL_ED
Implemented All Universal Safety Interventions:  [place] to call system. Call bell, personal items and telephone within reach. Instruct patient to call for assistance. Room bathroom lighting operational. Non-slip footwear when patient is off stretcher. Physically safe environment: no spills, clutter or unnecessary equipment. Stretcher in lowest position, wheels locked, appropriate side rails in place.

## 2021-10-14 NOTE — ED PROVIDER NOTE - TOBACCO USE
Body Location Override (Optional - Billing Will Still Be Based On Selected Body Map Location If Applicable): top right scalp Former smoker

## 2022-03-11 NOTE — ED PROVIDER NOTE - CPE EDP RESP NORM
PROVIDER:[TOKEN:[[id number]],FOLLOWUP:[2 weeks]],PROVIDER:[TOKEN:[[id number]],FOLLOWUP:[2 weeks]]
- - -

## 2022-04-21 ENCOUNTER — EMERGENCY (EMERGENCY)
Facility: HOSPITAL | Age: 87
LOS: 0 days | Discharge: HOME | End: 2022-04-22
Attending: EMERGENCY MEDICINE
Payer: MEDICARE

## 2022-04-21 VITALS — HEIGHT: 67 IN | WEIGHT: 220.02 LBS

## 2022-04-21 DIAGNOSIS — S70.02XA CONTUSION OF LEFT HIP, INITIAL ENCOUNTER: ICD-10-CM
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
## 2022-04-21 DIAGNOSIS — Z98.890 OTHER SPECIFIED POSTPROCEDURAL STATES: Chronic | ICD-10-CM

## 2022-04-21 DIAGNOSIS — Z79.01 LONG TERM (CURRENT) USE OF ANTICOAGULANTS: ICD-10-CM

## 2022-04-21 DIAGNOSIS — E78.00 PURE HYPERCHOLESTEROLEMIA, UNSPECIFIED: ICD-10-CM

## 2022-04-21 DIAGNOSIS — I10 ESSENTIAL (PRIMARY) HYPERTENSION: ICD-10-CM

## 2022-04-21 DIAGNOSIS — Z88.2 ALLERGY STATUS TO SULFONAMIDES: ICD-10-CM

## 2022-04-21 DIAGNOSIS — I25.10 ATHEROSCLEROTIC HEART DISEASE OF NATIVE CORONARY ARTERY WITHOUT ANGINA PECTORIS: ICD-10-CM
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
## 2022-04-21 DIAGNOSIS — E78.5 HYPERLIPIDEMIA, UNSPECIFIED: ICD-10-CM

## 2022-04-21 DIAGNOSIS — I48.91 UNSPECIFIED ATRIAL FIBRILLATION: ICD-10-CM

## 2022-04-21 PROCEDURE — 73552 X-RAY EXAM OF FEMUR 2/>: CPT | Mod: LT

## 2022-04-21 PROCEDURE — 99284 EMERGENCY DEPT VISIT MOD MDM: CPT

## 2022-04-21 PROCEDURE — 73502 X-RAY EXAM HIP UNI 2-3 VIEWS: CPT | Mod: LT

## 2022-04-21 PROCEDURE — 73562 X-RAY EXAM OF KNEE 3: CPT | Mod: LT

## 2022-04-21 PROCEDURE — 99284 EMERGENCY DEPT VISIT MOD MDM: CPT | Mod: 25

## 2022-04-21 NOTE — ED ADULT TRIAGE NOTE - CHIEF COMPLAINT QUOTE
pt ambulatory into ED s/p fall on Monday onto lamp. presents today as he noticed large bruise to left hip/buttocks accompanied with numbness to LLE. photograph provided by grandradha, large hematoma noted to affected area. pt is on anticoagulant, Eliquis. pmhx CHF.

## 2022-04-22 VITALS
SYSTOLIC BLOOD PRESSURE: 161 MMHG | DIASTOLIC BLOOD PRESSURE: 95 MMHG | RESPIRATION RATE: 18 BRPM | HEART RATE: 65 BPM | TEMPERATURE: 98 F | OXYGEN SATURATION: 100 %

## 2022-04-22 PROCEDURE — 73562 X-RAY EXAM OF KNEE 3: CPT | Mod: 26,LT

## 2022-04-22 PROCEDURE — 73502 X-RAY EXAM HIP UNI 2-3 VIEWS: CPT | Mod: 26,LT

## 2022-04-22 PROCEDURE — 73552 X-RAY EXAM OF FEMUR 2/>: CPT | Mod: 26,LT

## 2022-04-22 NOTE — ED PROVIDER NOTE - MUSCULOSKELETAL, MLM
Spine appears normal, range of motion is not limited, no muscle or joint tenderness'; neuro vasc intact.

## 2022-04-22 NOTE — ED PROVIDER NOTE - OBJECTIVE STATEMENT
Pt. is a 89 yo M with PMHx of atrial fibrillation on eliquis, HTN, hyperlipidemia, chronic back pain, sciataca, prostate cancer, right CEA, presents BIB grandson for bruise on left hip.  Patient states 5 days ago he was having work done at his house and looking behind sheet rock and it leaned and pushed him into the wall and he fell and sat on a lamp.  He was able to stand and walk after the fall and has not had any pain.  Patient was rubbing lotion on his legs today and saw a large bruise.  He has been having increasing sciataca symptoms with left knee pain and some tingling below knee on left leg.  Denies weakness or numbness.  States he fell into the wall and onto buttocks only.  Did not hit his head.  No LOC.  No urinary incontinence, back pain or trouble walking.  Walks at baseline with cane.

## 2022-04-22 NOTE — ED PROVIDER NOTE - NEUROLOGICAL, MLM
Alert and oriented, no focal deficits, no motor or sensory deficits. 5/5 strength and normal sensation;

## 2022-04-22 NOTE — ED ADULT NURSE NOTE - CHIEF COMPLAINT
Patient Specific Counseling (Will Not Stick From Patient To Patient): Advised patient that we will continue to watch the areas that are receiving chemotherapy, because they tend to have Dysplastic nevus Detail Level: Zone The patient is a 89y Male complaining of pain, upper leg.

## 2022-04-22 NOTE — ED PROVIDER NOTE - NSFOLLOWUPINSTRUCTIONS_ED_ALL_ED_FT
EnglishSpanish                                                                                                                                                                                                                                                                                                                                                                                                                                                                                                                                                                                                                                                                                                                                                                                Hematoma      A hematoma is a collection of blood. A hematoma can happen:  •Under the skin.      •In an organ.      •In a body space.      •In a joint space.      •In other tissues.      The blood can thicken (clot) to form a lump that you can see and feel. The lump is often hard and may become sore and tender. The lump can be very small or very big. Most hematomas get better in a few days to weeks. However, some hematomas may be serious and need medical care.      What are the causes?    This condition is caused by:  •An injury.      •Blood that leaks under the skin.      •Problems from surgeries.      •Medical conditions that cause bleeding or bruising.        What increases the risk?    You are more likely to develop this condition if:  •You are an older adult.      •You use medicines that thin your blood.        What are the signs or symptoms?     Symptoms depend on where the hematoma is in your body.•If the hematoma is under the skin, there is:  •A firm lump on the body.       •Pain and tenderness in the area.       •Bruising. The skin above the lump may be blue, dark blue, purple-red, or yellowish.      •If the hematoma is deep in the tissues or body spaces, there may be:  •Blood in the stomach. This may cause pain in the belly (abdomen), weakness, passing out (fainting), and shortness of breath.      •Blood in the head. This may cause a headache, weakness, trouble speaking or understanding speech, or passing out.          How is this diagnosed?    This condition is diagnosed based on:  •Your medical history.       •A physical exam.       •Imaging tests, such as ultrasound or CT scan.      •Blood tests.        How is this treated?    Treatment depends on the cause, size, and location of the hematoma. Treatment may include:  •Doing nothing. Many hematomas go away on their own without treatment.      •Surgery or close monitoring. This may be needed for large hematomas or hematomas that affect the body's organs.      •Medicines. These may be given if a medical condition caused the hematoma.        Follow these instructions at home:      Managing pain, stiffness, and swelling    •If told, put ice on the area.  •Put ice in a plastic bag.      •Place a towel between your skin and the bag.      •Leave the ice on for 20 minutes, 2–3 times a day for the first two days.      •If told, put heat on the affected area after putting ice on the area for two days. Use the heat source that your doctor tells you to use. This could be a moist heat pack or a heating pad. To do this:  •Place a towel between your skin and the heat source.      •Leave the heat on for 20–30 minutes.      •Remove the heat if your skin turns bright red. This is very important if you are unable to feel pain, heat, or cold. You may have a greater risk of getting burned.        •Raise (elevate) the affected area above the level of your heart while you are sitting or lying down.      •Wrap the affected area with an elastic bandage, if told by your doctor. Do not wrap the bandage too tightly.      •If your hematoma is on a leg or foot and is painful, your doctor may give you crutches. Use them as told by your doctor.      General instructions     •Take over-the-counter and prescription medicines only as told by your doctor.      •Keep all follow-up visits as told by your doctor. This is important.        Contact a doctor if:    •You have a fever.      •The swelling or bruising gets worse.      •You start to get more hematomas.        Get help right away if:    •Your pain gets worse.      •Your pain is not getting better with medicine.      •Your skin over the hematoma breaks or starts to bleed.    •Your hematoma is in your chest or belly and you:  •Pass out.      •Feel weak.      •Become short of breath.      •You have a hematoma on your scalp that is caused by a fall or injury, and you:  •Have a headache that gets worse.      •Have trouble speaking or understanding speech.      •Become less alert or you pass out.          Summary    •A hematoma is a collection of blood in any part of your body.      •Most hematomas get better on their own in a few days to weeks. Some may need medical care.      •Follow instructions from your doctor about how to care for your hematoma.      •Contact a doctor if the swelling or bruising gets worse, or if you are short of breath.      This information is not intended to replace advice given to you by your health care provider. Make sure you discuss any questions you have with your health care provider.      Document Revised: 05/23/2019 Document Reviewed: 05/23/2019    Elsevier Patient Education © 2022 [employer] See your primary doctor within 1 week to check to make sure bruising has improved.                                                                                                                                                                                                                                                                                                                                                                                                                                                                                                                                                                                                                                                                                                                                                                                Hematoma      A hematoma is a collection of blood. A hematoma can happen:  •Under the skin.      •In an organ.      •In a body space.      •In a joint space.      •In other tissues.      The blood can thicken (clot) to form a lump that you can see and feel. The lump is often hard and may become sore and tender. The lump can be very small or very big. Most hematomas get better in a few days to weeks. However, some hematomas may be serious and need medical care.      What are the causes?    This condition is caused by:  •An injury.      •Blood that leaks under the skin.      •Problems from surgeries.      •Medical conditions that cause bleeding or bruising.        What increases the risk?    You are more likely to develop this condition if:  •You are an older adult.      •You use medicines that thin your blood.        What are the signs or symptoms?     Symptoms depend on where the hematoma is in your body.•If the hematoma is under the skin, there is:  •A firm lump on the body.       •Pain and tenderness in the area.       •Bruising. The skin above the lump may be blue, dark blue, purple-red, or yellowish.      •If the hematoma is deep in the tissues or body spaces, there may be:  •Blood in the stomach. This may cause pain in the belly (abdomen), weakness, passing out (fainting), and shortness of breath.      •Blood in the head. This may cause a headache, weakness, trouble speaking or understanding speech, or passing out.          How is this diagnosed?    This condition is diagnosed based on:  •Your medical history.       •A physical exam.       •Imaging tests, such as ultrasound or CT scan.      •Blood tests.        How is this treated?    Treatment depends on the cause, size, and location of the hematoma. Treatment may include:  •Doing nothing. Many hematomas go away on their own without treatment.      •Surgery or close monitoring. This may be needed for large hematomas or hematomas that affect the body's organs.      •Medicines. These may be given if a medical condition caused the hematoma.        Follow these instructions at home:      Managing pain, stiffness, and swelling    •If told, put ice on the area.  •Put ice in a plastic bag.      •Place a towel between your skin and the bag.      •Leave the ice on for 20 minutes, 2–3 times a day for the first two days.      •If told, put heat on the affected area after putting ice on the area for two days. Use the heat source that your doctor tells you to use. This could be a moist heat pack or a heating pad. To do this:  •Place a towel between your skin and the heat source.      •Leave the heat on for 20–30 minutes.      •Remove the heat if your skin turns bright red. This is very important if you are unable to feel pain, heat, or cold. You may have a greater risk of getting burned.        •Raise (elevate) the affected area above the level of your heart while you are sitting or lying down.      •Wrap the affected area with an elastic bandage, if told by your doctor. Do not wrap the bandage too tightly.      •If your hematoma is on a leg or foot and is painful, your doctor may give you crutches. Use them as told by your doctor.      General instructions     •Take over-the-counter and prescription medicines only as told by your doctor.      •Keep all follow-up visits as told by your doctor. This is important.        Contact a doctor if:    •You have a fever.      •The swelling or bruising gets worse.      •You start to get more hematomas.        Get help right away if:    •Your pain gets worse.      •Your pain is not getting better with medicine.      •Your skin over the hematoma breaks or starts to bleed.    •Your hematoma is in your chest or belly and you:  •Pass out.      •Feel weak.      •Become short of breath.      •You have a hematoma on your scalp that is caused by a fall or injury, and you:  •Have a headache that gets worse.      •Have trouble speaking or understanding speech.      •Become less alert or you pass out.          Summary    •A hematoma is a collection of blood in any part of your body.      •Most hematomas get better on their own in a few days to weeks. Some may need medical care.      •Follow instructions from your doctor about how to care for your hematoma.      •Contact a doctor if the swelling or bruising gets worse, or if you are short of breath.      This information is not intended to replace advice given to you by your health care provider. Make sure you discuss any questions you have with your health care provider.      Document Revised: 05/23/2019 Document Reviewed: 05/23/2019    [employer] Patient Education © 2022 [employer]

## 2022-04-22 NOTE — ED ADULT NURSE NOTE - OBJECTIVE STATEMENT
Pt, ambulating with cane, presents to the ED c/o large bruise to Left hip/buttocks s/p fall on Monday onto a lamp. pt reports numbness from the left knee to the left foot. Full ROM and 5/5 strength noted to both lower extremities. pt is on Eliquis and has a PMH of CHF. No further complaints at this time.

## 2022-04-22 NOTE — ED PROVIDER NOTE - PATIENT PORTAL LINK FT
You can access the FollowMyHealth Patient Portal offered by [facility] by registering at the following website: [email or web address]. By joining [employer]’s FollowMyHealth portal, you will also be able to view your health information using other applications (apps) compatible with our system.

## 2022-04-22 NOTE — ED PROVIDER NOTE - CLINICAL SUMMARY MEDICAL DECISION MAKING FREE TEXT BOX
Xray of hip, pelvis, and knee: If no fracture; will reassure and have bruising checked by PMD in 1-2 days.

## 2023-07-28 DIAGNOSIS — R00.1 BRADYCARDIA, UNSPECIFIED: ICD-10-CM

## 2023-07-28 DIAGNOSIS — I48.91 UNSPECIFIED ATRIAL FIBRILLATION: ICD-10-CM

## 2023-07-28 DIAGNOSIS — Z92.89 PERSONAL HISTORY OF OTHER MEDICAL TREATMENT: ICD-10-CM

## 2023-07-28 DIAGNOSIS — Z87.891 PERSONAL HISTORY OF NICOTINE DEPENDENCE: ICD-10-CM

## 2023-07-28 DIAGNOSIS — I77.9 DISORDER OF ARTERIES AND ARTERIOLES, UNSPECIFIED: ICD-10-CM

## 2023-07-28 DIAGNOSIS — E66.9 OBESITY, UNSPECIFIED: ICD-10-CM

## 2023-07-28 DIAGNOSIS — I50.30 UNSPECIFIED DIASTOLIC (CONGESTIVE) HEART FAILURE: ICD-10-CM

## 2023-07-28 DIAGNOSIS — Z78.9 OTHER SPECIFIED HEALTH STATUS: ICD-10-CM

## 2023-07-28 PROBLEM — Z00.00 ENCOUNTER FOR PREVENTIVE HEALTH EXAMINATION: Status: ACTIVE | Noted: 2023-07-28

## 2023-10-12 ENCOUNTER — NON-APPOINTMENT (OUTPATIENT)
Age: 88
End: 2023-10-12

## 2023-10-12 ENCOUNTER — APPOINTMENT (OUTPATIENT)
Dept: CARDIOLOGY | Facility: CLINIC | Age: 88
End: 2023-10-12
Payer: MEDICARE

## 2023-10-12 VITALS
SYSTOLIC BLOOD PRESSURE: 134 MMHG | DIASTOLIC BLOOD PRESSURE: 72 MMHG | OXYGEN SATURATION: 95 % | BODY MASS INDEX: 33.8 KG/M2 | WEIGHT: 223 LBS | HEART RATE: 116 BPM | HEIGHT: 68 IN

## 2023-10-12 DIAGNOSIS — I50.33 ACUTE ON CHRONIC DIASTOLIC (CONGESTIVE) HEART FAILURE: ICD-10-CM

## 2023-10-12 PROCEDURE — 93000 ELECTROCARDIOGRAM COMPLETE: CPT

## 2023-10-12 PROCEDURE — 99215 OFFICE O/P EST HI 40 MIN: CPT

## 2023-10-12 RX ORDER — APIXABAN 5 MG/1
TABLET, FILM COATED ORAL
Refills: 0 | Status: DISCONTINUED | COMMUNITY
End: 2023-10-12

## 2023-10-15 LAB
ANION GAP SERPL CALC-SCNC: 9 MMOL/L
BUN SERPL-MCNC: 25 MG/DL
CALCIUM SERPL-MCNC: 8.8 MG/DL
CHLORIDE SERPL-SCNC: 106 MMOL/L
CO2 SERPL-SCNC: 26 MMOL/L
CREAT SERPL-MCNC: 1.28 MG/DL
EGFR: 52 ML/MIN/1.73M2
GLUCOSE SERPL-MCNC: 83 MG/DL
POTASSIUM SERPL-SCNC: 4.5 MMOL/L
SODIUM SERPL-SCNC: 141 MMOL/L

## 2023-11-28 ENCOUNTER — APPOINTMENT (OUTPATIENT)
Dept: CARDIOLOGY | Facility: CLINIC | Age: 88
End: 2023-11-28
Payer: MEDICARE

## 2023-11-28 VITALS
BODY MASS INDEX: 34.4 KG/M2 | WEIGHT: 227 LBS | SYSTOLIC BLOOD PRESSURE: 102 MMHG | HEIGHT: 68 IN | DIASTOLIC BLOOD PRESSURE: 72 MMHG | OXYGEN SATURATION: 99 % | HEART RATE: 93 BPM

## 2023-11-28 PROCEDURE — 99214 OFFICE O/P EST MOD 30 MIN: CPT

## 2023-12-26 LAB
ANION GAP SERPL CALC-SCNC: 12 MMOL/L
BUN SERPL-MCNC: 41 MG/DL
CALCIUM SERPL-MCNC: 9.6 MG/DL
CHLORIDE SERPL-SCNC: 103 MMOL/L
CO2 SERPL-SCNC: 28 MMOL/L
CREAT SERPL-MCNC: 1.57 MG/DL
EGFR: 41 ML/MIN/1.73M2
GLUCOSE SERPL-MCNC: 138 MG/DL
POTASSIUM SERPL-SCNC: 4.8 MMOL/L
SODIUM SERPL-SCNC: 143 MMOL/L

## 2023-12-28 ENCOUNTER — APPOINTMENT (OUTPATIENT)
Dept: CARDIOLOGY | Facility: CLINIC | Age: 88
End: 2023-12-28
Payer: MEDICARE

## 2023-12-28 VITALS
OXYGEN SATURATION: 95 % | BODY MASS INDEX: 33.8 KG/M2 | WEIGHT: 223 LBS | DIASTOLIC BLOOD PRESSURE: 60 MMHG | HEART RATE: 87 BPM | SYSTOLIC BLOOD PRESSURE: 90 MMHG | HEIGHT: 68 IN

## 2023-12-28 DIAGNOSIS — R05.9 COUGH, UNSPECIFIED: ICD-10-CM

## 2023-12-28 PROCEDURE — 99214 OFFICE O/P EST MOD 30 MIN: CPT

## 2023-12-28 RX ORDER — ENALAPRIL MALEATE 5 MG/1
5 TABLET ORAL DAILY
Qty: 45 | Refills: 0 | Status: ACTIVE | COMMUNITY

## 2023-12-28 RX ORDER — TAMSULOSIN HYDROCHLORIDE 0.4 MG/1
0.4 CAPSULE ORAL DAILY
Refills: 0 | Status: ACTIVE | COMMUNITY

## 2023-12-28 NOTE — HISTORY OF PRESENT ILLNESS
[FreeTextEntry1] : The patient is a 89-year-old white male with a past medical history remarkable for coronary artery disease, paroxysmal atrial fibrillation, hypertension, hypercholesterolemia, and congestive heart failure with a preserved ejection fraction who presented to the office on October 12 for evaluation of a cough. The patient was found to be in heart failure. The patient's furosemide dose was increased to 40 mg twice daily.  Laboratories from December 26 demonstrated an increase in the patient's creatinine from 1.28 to 1.57.  The patient's cough has improved.  His weight is down 3 pounds compared to his prior visit.  He is chest pain and dyspnea free

## 2023-12-28 NOTE — DISCUSSION/SUMMARY
[FreeTextEntry1] : acute on chronic diastolic congestive heart failure Improved.   The patient is nearly euvolemic. In view of the patient's low normal blood pressure and mild increase in his creatinine, I have recommended that we decrease his enalapril to 2.5 mg daily Basic metabolic profile 3 weeks Return to this office in 1 month for reevaluation. Mr. MARSHALL HART was instructed to call the office if he were to gain 3 lbs in one day or 5 lbs in one week.  coronary artery disease: We will continue medical therapy  paroxysmal atrial fibrillation The patient's Eliquis was changed to Xarelto during her recent hospital admission due to intermittent noncompliance with twice daily dosing  RTO 1 month

## 2023-12-28 NOTE — CARDIOLOGY SUMMARY
[de-identified] : Active Problems - Carotid Artery Disease: CAROTID US: 12/12/18, HERMINIO>80%, s/p CEA 2/6/19. Followed by Dr. Birmingham - CAD: abnormal Nuclear stress test 3/27/08, medical therapy requested by the patient, CATHETERIZATION: 11/15/12, pLAD 99 >BMS, residual mCX 50, EF 50%, NUCLEAR STRESS TEST: 3/3/20, nomal, EF 57%, PHARMACOLOGIC NUCLEAR STRESS TEST: 11/30/2021, normal, EF 64% - ECHOCARDIOGRAM: 11/29/2021, EF 65%, trace tricuspid regurgitation RVSP 18 mmHg, mild ascending aortic atheromatous disease - Hypertension - hypercholesterolemia: Intolerant of atorvastatin and pravastatin. Livalo is cost prohibitive - Obesity - Paroxysmal AFib: [id number] score of 4, Episode of atrial fibrillation 11/11/15, EVENT MONITOR: 9/12/2022, 3 day MCT, SR 59 (44-86) no significant pauses (12/28/15, 1 symptom =SR, NSR 62 (), rare PVCs, rare couplets, 2 triplets, 26 episodes of nonsustained SVT ranging from 3-22) beats. EPS 2/17/16, no SVT, abnormal sinus node fxn without clinical bradycardia -CHF preserved EF 1/30/20 -PE: Reported PE 9/2023, [facility]

## 2024-01-04 RX ORDER — RIVAROXABAN 15 MG/1
15 TABLET, FILM COATED ORAL
Qty: 90 | Refills: 3 | Status: ACTIVE | COMMUNITY
Start: 1900-01-01 | End: 1900-01-01

## 2024-01-25 LAB
ANION GAP SERPL CALC-SCNC: 14 MMOL/L
BUN SERPL-MCNC: 38 MG/DL
CALCIUM SERPL-MCNC: 8.6 MG/DL
CHLORIDE SERPL-SCNC: 103 MMOL/L
CO2 SERPL-SCNC: 24 MMOL/L
CREAT SERPL-MCNC: 1.27 MG/DL
EGFR: 52 ML/MIN/1.73M2
GLUCOSE SERPL-MCNC: 103 MG/DL
POTASSIUM SERPL-SCNC: 4.4 MMOL/L
SODIUM SERPL-SCNC: 141 MMOL/L

## 2024-01-29 ENCOUNTER — APPOINTMENT (OUTPATIENT)
Dept: CARDIOLOGY | Facility: CLINIC | Age: 89
End: 2024-01-29
Payer: MEDICARE

## 2024-01-29 VITALS
OXYGEN SATURATION: 97 % | HEART RATE: 53 BPM | HEIGHT: 68 IN | WEIGHT: 223 LBS | SYSTOLIC BLOOD PRESSURE: 108 MMHG | DIASTOLIC BLOOD PRESSURE: 80 MMHG | BODY MASS INDEX: 33.8 KG/M2

## 2024-01-29 PROCEDURE — 99214 OFFICE O/P EST MOD 30 MIN: CPT

## 2024-01-29 RX ORDER — VIT A/VIT C/VIT E/ZINC/COPPER 2148-113
TABLET ORAL
Refills: 0 | Status: ACTIVE | COMMUNITY

## 2024-01-29 RX ORDER — EZETIMIBE 10 MG/1
10 TABLET ORAL DAILY
Qty: 90 | Refills: 0 | Status: ACTIVE | COMMUNITY
Start: 1900-01-01 | End: 1900-01-01

## 2024-01-29 NOTE — CARDIOLOGY SUMMARY
[de-identified] :  - Carotid Artery Disease: CAROTID US: 12/12/18, HERMINIO>80%, s/p CEA 2/6/19. Followed by Dr. Birmingham - CAD: abnormal Nuclear stress test 3/27/08, medical therapy requested by the patient, CATHETERIZATION: 11/15/12, pLAD 99 >BMS, residual mCX 50, EF 50%, NUCLEAR STRESS TEST: 3/3/20, nomal, EF 57%, PHARMACOLOGIC NUCLEAR STRESS TEST: 11/30/2021, normal, EF 64% - ECHOCARDIOGRAM: 11/29/2021, EF 65%, trace tricuspid regurgitation RVSP 18 mmHg, mild ascending aortic atheromatous disease - Hypertension - hypercholesterolemia: Intolerant of atorvastatin and pravastatin. Livalo is cost prohibitive - Obesity - Paroxysmal AFib: [id number] score of 4, Episode of atrial fibrillation 11/11/15, EVENT MONITOR: 9/12/2022, 3 day MCT, SR 59 (44-86) no significant pauses (12/28/15, 1 symptom =SR, NSR 62 (), rare PVCs, rare couplets, 2 triplets, 26 episodes of nonsustained SVT ranging from 3-22) beats. EPS 2/17/16, no SVT, abnormal sinus node fxn without clinical bradycardia -CHF preserved EF 1/30/20 -PE: Reported PE 9/2023, [facility]

## 2024-01-29 NOTE — REASON FOR VISIT
[FreeTextEntry1] : Chief complaint: Acute on chronic diastolic congestive heart failure/medication change

## 2024-01-29 NOTE — HISTORY OF PRESENT ILLNESS
[FreeTextEntry1] : The patient is a 89-year-old white male with a past medical history remarkable for coronary artery disease, paroxysmal atrial fibrillation, hypertension, hypercholesterolemia, and congestive heart failure with a preserved ejection fraction who presented to the office on October 12 for evaluation of a cough. The patient was found to be in heart failure. The patient's furosemide dose was increased to 40 mg twice daily.  Laboratories from December 26 demonstrated an increase in the patient's creatinine from 1.28 to 1.57.  The patient's cough has improved.  His weight is down 3 pounds compared to his prior visit.  He is chest pain and dyspnea free The patient's enalapril dose was decreased.  The patient's blood pressure is controlled.  His creatinine has improved to 1.27

## 2024-01-29 NOTE — PHYSICAL EXAM
[Well Developed] : well developed [Well Nourished] : well nourished [No Acute Distress] : no acute distress [Normal Conjunctiva] : normal conjunctiva [Normal Venous Pressure] : normal venous pressure [No Carotid Bruit] : no carotid bruit [Normal S1, S2] : normal S1, S2 [No Murmur] : no murmur [No Rub] : no rub [No Gallop] : no gallop [Soft] : abdomen soft [Non Tender] : non-tender [No Masses/organomegaly] : no masses/organomegaly [Normal Bowel Sounds] : normal bowel sounds [Normal Gait] : normal gait [No Rash] : no rash [No Skin Lesions] : no skin lesions [Moves all extremities] : moves all extremities [No Focal Deficits] : no focal deficits [Normal Speech] : normal speech [Alert and Oriented] : alert and oriented [Normal memory] : normal memory [de-identified] : Clear today, basilar crackles resolved [de-identified] : Improved, 1+ pitting edema encompassing the distal third of the right shin, 1+ left ankle edema  none

## 2024-01-29 NOTE — DISCUSSION/SUMMARY
[FreeTextEntry1] : acute on chronic diastolic congestive heart failure Improved.   The patient is nearly euvolemic. Continue low-dose enalapril  Mr. MARSHALL HART was instructed to call the office if he were to gain 3 lbs in one day or 5 lbs in one week.  coronary artery disease: We will continue medical therapy  paroxysmal atrial fibrillation The patient's Eliquis was changed to Xarelto during her recent hospital admission due to intermittent noncompliance with twice daily dosing  Renal insufficiency Improved with decreasing the patient's enalapril dose to 2.5 mg  RTO 3 months

## 2024-02-24 ENCOUNTER — RX RENEWAL (OUTPATIENT)
Age: 89
End: 2024-02-24

## 2024-04-29 ENCOUNTER — APPOINTMENT (OUTPATIENT)
Dept: CARDIOLOGY | Facility: CLINIC | Age: 89
End: 2024-04-29
Payer: MEDICARE

## 2024-04-29 VITALS
OXYGEN SATURATION: 98 % | SYSTOLIC BLOOD PRESSURE: 124 MMHG | DIASTOLIC BLOOD PRESSURE: 82 MMHG | BODY MASS INDEX: 33.8 KG/M2 | WEIGHT: 223 LBS | HEIGHT: 68 IN | HEART RATE: 109 BPM

## 2024-04-29 VITALS — HEART RATE: 84 BPM

## 2024-04-29 DIAGNOSIS — I48.0 PAROXYSMAL ATRIAL FIBRILLATION: ICD-10-CM

## 2024-04-29 DIAGNOSIS — I10 ESSENTIAL (PRIMARY) HYPERTENSION: ICD-10-CM

## 2024-04-29 DIAGNOSIS — I25.10 ATHEROSCLEROTIC HEART DISEASE OF NATIVE CORONARY ARTERY W/OUT ANGINA PECTORIS: ICD-10-CM

## 2024-04-29 DIAGNOSIS — I50.32 CHRONIC DIASTOLIC (CONGESTIVE) HEART FAILURE: ICD-10-CM

## 2024-04-29 DIAGNOSIS — R94.31 ABNORMAL ELECTROCARDIOGRAM [ECG] [EKG]: ICD-10-CM

## 2024-04-29 PROCEDURE — 93000 ELECTROCARDIOGRAM COMPLETE: CPT

## 2024-04-29 PROCEDURE — 99214 OFFICE O/P EST MOD 30 MIN: CPT

## 2024-04-29 RX ORDER — ENALAPRIL MALEATE 2.5 MG/1
2.5 TABLET ORAL DAILY
Qty: 90 | Refills: 0 | Status: DISCONTINUED | COMMUNITY
Start: 2024-01-29 | End: 2024-04-29

## 2024-04-29 NOTE — DISCUSSION/SUMMARY
[FreeTextEntry1] : acute on chronic diastolic congestive heart failure Euvolemic. Continue present medical regimen  Mr. MARSHALL HART was instructed to call the office if he were to gain 3 lbs in one day or 5 lbs in one week.  coronary artery disease: We will continue medical therapy  paroxysmal atrial fibrillation The patient's Eliquis was changed to Xarelto during her recent hospital admission due to intermittent noncompliance with twice daily dosing  Renal insufficiency Improved with decreasing the patient's enalapril dose to 2.5 mg Laboratory results requested from [facility]  RTO 6 months [EKG obtained to assist in diagnosis and management of assessed problem(s)] : EKG obtained to assist in diagnosis and management of assessed problem(s)

## 2024-04-29 NOTE — HISTORY OF PRESENT ILLNESS
[FreeTextEntry1] : The patient is a 89-year-old white male with a past medical history remarkable for coronary artery disease, paroxysmal atrial fibrillation, hypertension, hypercholesterolemia, and congestive heart failure with a preserved ejection fraction. The patient reports that he was recently admitted to [facility] for bronchitis.  He was placed on steroids.  His shortness of breath and cough have resolved.  Laboratory results were requested from [facility]

## 2024-04-29 NOTE — CARDIOLOGY SUMMARY
[de-identified] : Atrial fibrillation, left axis deviation, poor R wave progression, Q waves leads III and F, no significant change from prior [de-identified] : - Carotid Artery Disease: CAROTID US: 12/12/18, HERMINIO>80%, s/p CEA 2/6/19. Followed by Dr. Birmingham - CAD: abnormal Nuclear stress test 3/27/08, medical therapy requested by the patient, CATHETERIZATION: 11/15/12, pLAD 99 >BMS, residual mCX 50, EF 50%, NUCLEAR STRESS TEST: 3/3/20, nomal, EF 57%, PHARMACOLOGIC NUCLEAR STRESS TEST: 11/30/2021, normal, EF 64% - ECHOCARDIOGRAM: 11/29/2021, EF 65%, trace tricuspid regurgitation RVSP 18 mmHg, mild ascending aortic atheromatous disease - Hypertension - hypercholesterolemia: Intolerant of atorvastatin and pravastatin. Livalo is cost prohibitive - Obesity - Paroxysmal AFib: [id number] score of 4, Episode of atrial fibrillation 11/11/15, EVENT MONITOR: 9/12/2022, 3 day MCT, SR 59 (44-86) no significant pauses (12/28/15, 1 symptom =SR, NSR 62 (), rare PVCs, rare couplets, 2 triplets, 26 episodes of nonsustained SVT ranging from 3-22) beats. EPS 2/17/16, no SVT, abnormal sinus node fxn without clinical bradycardia -CHF preserved EF 1/30/20, low-dose enalapril secondary to renal insufficiency -PE: Reported PE 9/2023, [facility]

## 2024-04-29 NOTE — PHYSICAL EXAM
[Well Developed] : well developed [Well Nourished] : well nourished [No Acute Distress] : no acute distress [Normal Conjunctiva] : normal conjunctiva [Normal Venous Pressure] : normal venous pressure [No Carotid Bruit] : no carotid bruit [Normal S1, S2] : normal S1, S2 [No Murmur] : no murmur [No Rub] : no rub [No Gallop] : no gallop [Soft] : abdomen soft [Non Tender] : non-tender [No Masses/organomegaly] : no masses/organomegaly [Normal Bowel Sounds] : normal bowel sounds [Normal Gait] : normal gait [No Rash] : no rash [No Skin Lesions] : no skin lesions [Moves all extremities] : moves all extremities [No Focal Deficits] : no focal deficits [Normal Speech] : normal speech [Alert and Oriented] : alert and oriented [Normal memory] : normal memory [de-identified] : Irregular rate and rhythm [de-identified] : Clear today, basilar crackles resolved [de-identified] : Improved, 1+ pitting edema encompassing the distal third of the right shin, 1+ left ankle edema

## 2024-05-17 RX ORDER — FUROSEMIDE 40 MG/1
40 TABLET ORAL
Qty: 180 | Refills: 0 | Status: ACTIVE | COMMUNITY
Start: 1900-01-01 | End: 1900-01-01

## 2024-05-21 ENCOUNTER — APPOINTMENT (OUTPATIENT)
Dept: NUCLEAR MEDICINE | Facility: CLINIC | Age: 89
End: 2024-05-21

## 2024-06-28 ENCOUNTER — OUTPATIENT (OUTPATIENT)
Dept: OUTPATIENT SERVICES | Facility: HOSPITAL | Age: 89
LOS: 1 days | End: 2024-06-28
Payer: MEDICARE

## 2024-06-28 ENCOUNTER — APPOINTMENT (OUTPATIENT)
Dept: NUCLEAR MEDICINE | Facility: CLINIC | Age: 89
End: 2024-06-28

## 2024-06-28 DIAGNOSIS — Z98.890 OTHER SPECIFIED POSTPROCEDURAL STATES: Chronic | ICD-10-CM

## 2024-06-28 DIAGNOSIS — C61 MALIGNANT NEOPLASM OF PROSTATE: ICD-10-CM

## 2024-06-28 PROCEDURE — 78816 PET IMAGE W/CT FULL BODY: CPT | Mod: 26,MH,PI

## 2024-08-15 ENCOUNTER — RX RENEWAL (OUTPATIENT)
Age: 89
End: 2024-08-15

## 2024-10-21 ENCOUNTER — NON-APPOINTMENT (OUTPATIENT)
Age: 89
End: 2024-10-21

## 2024-10-21 ENCOUNTER — APPOINTMENT (OUTPATIENT)
Dept: CARDIOLOGY | Facility: CLINIC | Age: 89
End: 2024-10-21
Payer: MEDICARE

## 2024-10-21 VITALS
WEIGHT: 232 LBS | SYSTOLIC BLOOD PRESSURE: 138 MMHG | HEART RATE: 98 BPM | BODY MASS INDEX: 35.16 KG/M2 | HEIGHT: 68 IN | DIASTOLIC BLOOD PRESSURE: 84 MMHG | OXYGEN SATURATION: 99 %

## 2024-10-21 DIAGNOSIS — I50.33 ACUTE ON CHRONIC DIASTOLIC (CONGESTIVE) HEART FAILURE: ICD-10-CM

## 2024-10-21 DIAGNOSIS — I10 ESSENTIAL (PRIMARY) HYPERTENSION: ICD-10-CM

## 2024-10-21 DIAGNOSIS — I48.91 UNSPECIFIED ATRIAL FIBRILLATION: ICD-10-CM

## 2024-10-21 DIAGNOSIS — I25.10 ATHEROSCLEROTIC HEART DISEASE OF NATIVE CORONARY ARTERY W/OUT ANGINA PECTORIS: ICD-10-CM

## 2024-10-21 PROCEDURE — 99214 OFFICE O/P EST MOD 30 MIN: CPT

## 2024-10-21 PROCEDURE — 93000 ELECTROCARDIOGRAM COMPLETE: CPT

## 2024-10-21 RX ORDER — ALBUTEROL SULFATE 2.5 MG/3ML
(2.5 MG/3ML) SOLUTION RESPIRATORY (INHALATION)
Qty: 180 | Refills: 0 | Status: ACTIVE | COMMUNITY
Start: 2024-05-01

## 2025-01-06 ENCOUNTER — RX RENEWAL (OUTPATIENT)
Age: 89
End: 2025-01-06

## 2025-04-22 ENCOUNTER — APPOINTMENT (OUTPATIENT)
Dept: CARDIOLOGY | Facility: CLINIC | Age: 89
End: 2025-04-22
Payer: MEDICARE

## 2025-04-22 VITALS
HEIGHT: 68 IN | SYSTOLIC BLOOD PRESSURE: 112 MMHG | HEART RATE: 87 BPM | BODY MASS INDEX: 35.01 KG/M2 | WEIGHT: 231 LBS | DIASTOLIC BLOOD PRESSURE: 76 MMHG | OXYGEN SATURATION: 98 %

## 2025-04-22 DIAGNOSIS — I50.32 CHRONIC DIASTOLIC (CONGESTIVE) HEART FAILURE: ICD-10-CM

## 2025-04-22 DIAGNOSIS — I10 ESSENTIAL (PRIMARY) HYPERTENSION: ICD-10-CM

## 2025-04-22 DIAGNOSIS — I25.10 ATHEROSCLEROTIC HEART DISEASE OF NATIVE CORONARY ARTERY W/OUT ANGINA PECTORIS: ICD-10-CM

## 2025-04-22 DIAGNOSIS — I48.0 PAROXYSMAL ATRIAL FIBRILLATION: ICD-10-CM

## 2025-04-22 PROCEDURE — 93000 ELECTROCARDIOGRAM COMPLETE: CPT

## 2025-04-22 PROCEDURE — 99214 OFFICE O/P EST MOD 30 MIN: CPT

## 2025-06-04 ENCOUNTER — RX RENEWAL (OUTPATIENT)
Age: 89
End: 2025-06-04

## 2025-09-01 ENCOUNTER — RX RENEWAL (OUTPATIENT)
Age: 89
End: 2025-09-01